# Patient Record
Sex: FEMALE | Race: WHITE | NOT HISPANIC OR LATINO | URBAN - METROPOLITAN AREA
[De-identification: names, ages, dates, MRNs, and addresses within clinical notes are randomized per-mention and may not be internally consistent; named-entity substitution may affect disease eponyms.]

---

## 2018-01-08 NOTE — PATIENT DISCUSSION
I have recommended that she see Dr. Keesha Sofia for yag cap. in the left eye. 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 She will return here on an as needed basis.

## 2019-02-08 NOTE — PATIENT DISCUSSION
Retinal exam findings communicated to Physician managing diabetes. Simple: Patient demonstrates quick and easy understanding/Verbalized Understanding

## 2019-02-08 NOTE — PROCEDURE NOTE: CLINICAL
PROCEDURE NOTE: Lucentis 0.3mg PFS OS. Diagnosis: Diabetic Macular Edema. Anesthesia: Topical. Prep: Betadine Flush. Prior to injection, risks/benefits/alternatives discussed including but not limited to infection, loss of vision or eye, hemorrhage, cataract, glaucoma, retinal tears or detachment. The patient wished to proceed with treatment. Topical anesthesia was induced with Alcaine. Additional anesthesia was achieved using drop(s) or injection checked above. A drop of Povidone-iodine 5% ophthalmic solution was instilled over the injection site and in the inferior fornix. Betadine prep was performed. A single use prefilled syringe of intravitreal Lucentis 0.3mg/0.05ml was used and excess discarded. The needle was passed 3.0 mm posterior to the limbus in pseudophakic patients, and 3.5 mm posterior to the limbus in phakic patients. The remainder of the Lucentis 0.3mg in the single-use vial was then discarded in a medical waste disposal container. The eye was irrigated with sterile irrigating solution. Patient tolerated the procedure well. There were no complications. Post procedure instructions given. CF vision checked. Injection Time *. The patient was instructed to return for re-evaluation in approximately 4-12 weeks depending on his/her condition and was told to call immediately if vision decreases and/or if his/her eye becomes red, painful, and/or light sensitive. The patient was instructed to go to the emergency room or call 911 if unable to reach the doctor within an hour or two of trying or calling. Sal Holguin

## 2019-03-15 NOTE — PROCEDURE NOTE: CLINICAL
PROCEDURE NOTE: Lucentis 0.3mg PFS OS. Diagnosis: Diabetic Macular Edema. Anesthesia: Topical. Prep: Betadine Flush. Prior to injection, risks/benefits/alternatives discussed including but not limited to infection, loss of vision or eye, hemorrhage, cataract, glaucoma, retinal tears or detachment. The patient wished to proceed with treatment. Topical anesthesia was induced with Alcaine. Additional anesthesia was achieved using drop(s) or injection checked above. A drop of Povidone-iodine 5% ophthalmic solution was instilled over the injection site and in the inferior fornix. Betadine prep was performed. A single use prefilled syringe of intravitreal Lucentis 0.3mg/0.05ml was used and excess discarded. The needle was passed 3.0 mm posterior to the limbus in pseudophakic patients, and 3.5 mm posterior to the limbus in phakic patients. The remainder of the Lucentis 0.3mg in the single-use vial was then discarded in a medical waste disposal container. The eye was irrigated with sterile irrigating solution. Patient tolerated the procedure well. There were no complications. Post procedure instructions given. CF vision checked. Injection Time 1005. The patient was instructed to return for re-evaluation in approximately 4-12 weeks depending on his/her condition and was told to call immediately if vision decreases and/or if his/her eye becomes red, painful, and/or light sensitive. The patient was instructed to go to the emergency room or call 911 if unable to reach the doctor within an hour or two of trying or calling. [place]

## 2020-02-14 NOTE — PATIENT DISCUSSION
LUCENTIS AND REPAT IN 5 WKS  - MODERATE EDEAM AT @ 8 WKS - RETX AND REDUCE F/U TO TRY AND CLEAR EDEMA.

## 2020-02-14 NOTE — PROCEDURE NOTE: CLINICAL
PROCEDURE NOTE: Lucentis 0.3mg PFS OS. Diagnosis: Diabetic Macular Edema. Anesthesia: Lidocaine. Prep: Betadine Flush. Prior to injection, risks/benefits/alternatives discussed including but not limited to infection, loss of vision or eye, hemorrhage, cataract, glaucoma, retinal tears or detachment. The patient wished to proceed with treatment. Topical anesthesia was induced with Alcaine. Additional anesthesia was achieved using drop(s) or injection checked above. A drop of Povidone-iodine 5% ophthalmic solution was instilled over the injection site and in the inferior fornix. Betadine prep was performed. A single use prefilled syringe of intravitreal Lucentis 0.3mg/0.05ml was used and excess discarded. The needle was passed 3.0 mm posterior to the limbus in pseudophakic patients, and 3.5 mm posterior to the limbus in phakic patients. The remainder of the Lucentis 0.3mg in the single-use vial was then discarded in a medical waste disposal container. The eye was irrigated with sterile irrigating solution. Patient tolerated the procedure well. There were no complications. Post procedure instructions given. CF vision checked. Injection Time 12:00PM. The patient was instructed to return for re-evaluation in approximately 4-12 weeks depending on his/her condition and was told to call immediately if vision decreases and/or if his/her eye becomes red, painful, and/or light sensitive. The patient was instructed to go to the emergency room or call 911 if unable to reach the doctor within an hour or two of trying or calling. Jeri Dietz

## 2020-07-09 ENCOUNTER — OFFICE VISIT (OUTPATIENT)
Dept: URBAN - METROPOLITAN AREA SURGERY CENTER 4 | Facility: SURGERY CENTER | Age: 70
End: 2020-07-09

## 2020-07-13 LAB — PATHOLOGY (INDENTED REPORT): (no result)

## 2022-01-24 ENCOUNTER — NEW PATIENT (OUTPATIENT)
Dept: URBAN - METROPOLITAN AREA CLINIC 26 | Facility: CLINIC | Age: 72
End: 2022-01-24

## 2022-01-24 VITALS
HEART RATE: 70 BPM | DIASTOLIC BLOOD PRESSURE: 91 MMHG | HEIGHT: 67 IN | WEIGHT: 125 LBS | BODY MASS INDEX: 19.62 KG/M2 | SYSTOLIC BLOOD PRESSURE: 154 MMHG

## 2022-01-24 DIAGNOSIS — H43.813: ICD-10-CM

## 2022-01-24 DIAGNOSIS — H04.123: ICD-10-CM

## 2022-01-24 DIAGNOSIS — H35.3132: ICD-10-CM

## 2022-01-24 DIAGNOSIS — H35.363: ICD-10-CM

## 2022-01-24 DIAGNOSIS — H35.373: ICD-10-CM

## 2022-01-24 DIAGNOSIS — H33.011: ICD-10-CM

## 2022-01-24 PROCEDURE — 99204 OFFICE O/P NEW MOD 45 MIN: CPT

## 2022-01-24 PROCEDURE — 92134 CPTRZ OPH DX IMG PST SGM RTA: CPT

## 2022-01-24 PROCEDURE — 92250 FUNDUS PHOTOGRAPHY W/I&R: CPT

## 2022-01-24 RX ORDER — TOBRAMYCIN 3 MG/ML: 1 SOLUTION/ DROPS OPHTHALMIC

## 2022-01-24 ASSESSMENT — VISUAL ACUITY
OD_SC: 20/200
OS_SC: 20/20-2
OD_SC: 20/60

## 2022-01-24 ASSESSMENT — TONOMETRY
OD_IOP_MMHG: 14
OS_IOP_MMHG: 16

## 2022-01-24 NOTE — PATIENT DISCUSSION
1/24/22: GIVEN INFERIOR LOCATION, ? TWO LESIONS AND EARLY PVR Recommended VITRECTOMY, endolaser, FLUID/GAS EXCHANGE, and SEGMENTAL INFERIOR SCLERAL BUCKLE. Discussed benefits, alternatives, and risks of surgery including (but not limited to) cataract (if patient has natural lens), glaucoma, infection, bleeding, redetachment, optic neuropathy, loss of vision, blindness, double vision and loss of eye. Patient understands more than one operation may be needed to repair retinal detachment.

## 2022-01-24 NOTE — PATIENT DISCUSSION
No retinal tears or retinal detachment seen on clinical exam today OS, OD AS ABOVE. Reviewed the signs and symptoms of retinal tear/retinal detachment and the importance of calling for prompt evaluation should there be increasing floaters, new flashing lights, or decreasing peripheral vision in either eye at any time. Observation recommended.

## 2022-01-26 ENCOUNTER — SURGERY/PROCEDURE (OUTPATIENT)
Dept: URBAN - METROPOLITAN AREA SURGERY 10 | Facility: SURGERY | Age: 72
End: 2022-01-26

## 2022-01-26 DIAGNOSIS — H33.011: ICD-10-CM

## 2022-01-26 PROCEDURE — 68841 INSJ RX ELUT IMPLT LAC CANAL: CPT

## 2022-01-26 PROCEDURE — 67108 REPAIR DETACHED RETINA: CPT

## 2022-01-27 ENCOUNTER — POST-OP (OUTPATIENT)
Dept: URBAN - METROPOLITAN AREA CLINIC 26 | Facility: CLINIC | Age: 72
End: 2022-01-27

## 2022-01-27 DIAGNOSIS — Z98.890: ICD-10-CM

## 2022-01-27 DIAGNOSIS — H33.011: ICD-10-CM

## 2022-01-27 PROCEDURE — 99024 POSTOP FOLLOW-UP VISIT: CPT

## 2022-01-27 ASSESSMENT — TONOMETRY
OS_IOP_MMHG: 15
OD_IOP_MMHG: 21
OD_IOP_MMHG: 22

## 2022-01-27 ASSESSMENT — VISUAL ACUITY: OS_SC: 20/20

## 2022-02-02 ENCOUNTER — POST-OP (OUTPATIENT)
Dept: URBAN - METROPOLITAN AREA CLINIC 26 | Facility: CLINIC | Age: 72
End: 2022-02-02

## 2022-02-02 DIAGNOSIS — H33.011: ICD-10-CM

## 2022-02-02 DIAGNOSIS — Z98.890: ICD-10-CM

## 2022-02-02 PROCEDURE — 99024 POSTOP FOLLOW-UP VISIT: CPT

## 2022-02-02 RX ORDER — DORZOLAMIDE HYDROCHLORIDE AND TIMOLOL MALEATE 20; 5 MG/ML; MG/ML: 1 SOLUTION/ DROPS OPHTHALMIC

## 2022-02-02 ASSESSMENT — VISUAL ACUITY
OS_SC: 20/25+2
OD_SC: 20/400-1
OD_PH: 20/200-1

## 2022-02-02 ASSESSMENT — TONOMETRY
OD_IOP_MMHG: 32
OS_IOP_MMHG: 14
OD_IOP_MMHG: 33

## 2022-02-09 ENCOUNTER — POST-OP (OUTPATIENT)
Dept: URBAN - METROPOLITAN AREA CLINIC 26 | Facility: CLINIC | Age: 72
End: 2022-02-09

## 2022-02-09 DIAGNOSIS — H35.363: ICD-10-CM

## 2022-02-09 DIAGNOSIS — Z98.890: ICD-10-CM

## 2022-02-09 DIAGNOSIS — H35.3132: ICD-10-CM

## 2022-02-09 DIAGNOSIS — H33.011: ICD-10-CM

## 2022-02-09 DIAGNOSIS — H35.373: ICD-10-CM

## 2022-02-09 PROCEDURE — 99024 POSTOP FOLLOW-UP VISIT: CPT

## 2022-02-09 PROCEDURE — 92250 FUNDUS PHOTOGRAPHY W/I&R: CPT

## 2022-02-09 ASSESSMENT — TONOMETRY
OD_IOP_MMHG: 18
OS_IOP_MMHG: 15

## 2022-02-09 ASSESSMENT — VISUAL ACUITY
OS_SC: 20/25
OD_SC: CF 2FT

## 2022-02-09 NOTE — PATIENT DISCUSSION
DISCUSSED THE DX, IMAGES, PROGNOSIS AND TX PLAN. PT REQUIRES SX, RISK OF VI: MOD/HIGH. ALL QUESTIONS WERE ANSWERED. normal sinus rhythm

## 2022-02-09 NOTE — PATIENT DISCUSSION
2/2/22: WILL ADD COSOPT TO CONTROL IOP SPIKE. MED SHOULD BE DECREASING. Recommended continued MONITORING of RESPONSE to therapy.

## 2022-03-01 ENCOUNTER — POST-OP (OUTPATIENT)
Dept: URBAN - METROPOLITAN AREA CLINIC 26 | Facility: CLINIC | Age: 72
End: 2022-03-01

## 2022-03-01 DIAGNOSIS — H35.363: ICD-10-CM

## 2022-03-01 DIAGNOSIS — H33.011: ICD-10-CM

## 2022-03-01 DIAGNOSIS — Z98.890: ICD-10-CM

## 2022-03-01 DIAGNOSIS — H35.3132: ICD-10-CM

## 2022-03-01 PROCEDURE — 92134 CPTRZ OPH DX IMG PST SGM RTA: CPT

## 2022-03-01 PROCEDURE — 99024 POSTOP FOLLOW-UP VISIT: CPT

## 2022-03-01 ASSESSMENT — TONOMETRY
OS_IOP_MMHG: 14
OD_IOP_MMHG: 15

## 2022-03-01 ASSESSMENT — VISUAL ACUITY
OD_SC: CF 1FT
OS_SC: 20/30-1

## 2022-03-01 NOTE — PATIENT DISCUSSION
My findings and recommendations are based on patient's symptoms, eye exam, diagnostic testing, and records. Home

## 2022-04-07 ENCOUNTER — POST-OP (OUTPATIENT)
Dept: URBAN - METROPOLITAN AREA CLINIC 26 | Facility: CLINIC | Age: 72
End: 2022-04-07

## 2022-04-07 DIAGNOSIS — H35.373: ICD-10-CM

## 2022-04-07 DIAGNOSIS — H35.363: ICD-10-CM

## 2022-04-07 DIAGNOSIS — Z98.890: ICD-10-CM

## 2022-04-07 DIAGNOSIS — H33.011: ICD-10-CM

## 2022-04-07 DIAGNOSIS — H35.3132: ICD-10-CM

## 2022-04-07 PROCEDURE — 99024 POSTOP FOLLOW-UP VISIT: CPT

## 2022-04-07 PROCEDURE — 92134 CPTRZ OPH DX IMG PST SGM RTA: CPT

## 2022-04-07 ASSESSMENT — TONOMETRY
OS_IOP_MMHG: 16
OD_IOP_MMHG: 17

## 2022-04-07 ASSESSMENT — VISUAL ACUITY
OD_SC: 20/30-1
OS_SC: 20/25

## 2022-06-07 ENCOUNTER — FOLLOW UP (OUTPATIENT)
Dept: URBAN - METROPOLITAN AREA CLINIC 26 | Facility: CLINIC | Age: 72
End: 2022-06-07

## 2022-06-07 DIAGNOSIS — H40.9: ICD-10-CM

## 2022-06-07 DIAGNOSIS — H04.123: ICD-10-CM

## 2022-06-07 DIAGNOSIS — H43.813: ICD-10-CM

## 2022-06-07 DIAGNOSIS — H40.041: ICD-10-CM

## 2022-06-07 DIAGNOSIS — H35.3132: ICD-10-CM

## 2022-06-07 DIAGNOSIS — H35.363: ICD-10-CM

## 2022-06-07 DIAGNOSIS — H35.373: ICD-10-CM

## 2022-06-07 DIAGNOSIS — H33.011: ICD-10-CM

## 2022-06-07 PROCEDURE — 92134 CPTRZ OPH DX IMG PST SGM RTA: CPT

## 2022-06-07 PROCEDURE — 92014 COMPRE OPH EXAM EST PT 1/>: CPT

## 2022-06-07 RX ORDER — DORZOLAMIDE 20 MG/ML: 1 SOLUTION/ DROPS OPHTHALMIC TWICE A DAY

## 2022-06-07 ASSESSMENT — TONOMETRY
OS_IOP_MMHG: 14
OD_IOP_MMHG: 16

## 2022-06-07 ASSESSMENT — VISUAL ACUITY
OS_SC: 20/20-1
OD_SC: 20/30+1

## 2022-06-07 NOTE — PATIENT DISCUSSION
ERM does NOT APPEAR VISUALLY SIGNIFICANT OS. SLIGHTLY PROGRESSED IN OD. WE'LL CONT TO MONITOR TO DETERMINE NEED FOR PEELING.

## 2022-07-06 ENCOUNTER — FOLLOW UP (OUTPATIENT)
Dept: URBAN - METROPOLITAN AREA CLINIC 26 | Facility: CLINIC | Age: 72
End: 2022-07-06

## 2022-07-06 DIAGNOSIS — H04.123: ICD-10-CM

## 2022-07-06 DIAGNOSIS — H40.9: ICD-10-CM

## 2022-07-06 DIAGNOSIS — H35.373: ICD-10-CM

## 2022-07-06 DIAGNOSIS — H40.041: ICD-10-CM

## 2022-07-06 DIAGNOSIS — H35.363: ICD-10-CM

## 2022-07-06 DIAGNOSIS — H33.011: ICD-10-CM

## 2022-07-06 DIAGNOSIS — H43.813: ICD-10-CM

## 2022-07-06 DIAGNOSIS — H35.3132: ICD-10-CM

## 2022-07-06 PROCEDURE — 92134 CPTRZ OPH DX IMG PST SGM RTA: CPT

## 2022-07-06 PROCEDURE — 92250 FUNDUS PHOTOGRAPHY W/I&R: CPT

## 2022-07-06 PROCEDURE — 92012 INTRM OPH EXAM EST PATIENT: CPT

## 2022-07-06 ASSESSMENT — TONOMETRY
OD_IOP_MMHG: 15
OS_IOP_MMHG: 16

## 2022-07-06 ASSESSMENT — VISUAL ACUITY
OD_SC: 20/25-2
OS_SC: 20/20

## 2022-07-09 ENCOUNTER — TELEPHONE ENCOUNTER (OUTPATIENT)
Dept: URBAN - METROPOLITAN AREA CLINIC 121 | Facility: CLINIC | Age: 72
End: 2022-07-09

## 2022-07-10 ENCOUNTER — TELEPHONE ENCOUNTER (OUTPATIENT)
Dept: URBAN - METROPOLITAN AREA CLINIC 121 | Facility: CLINIC | Age: 72
End: 2022-07-10

## 2022-07-10 RX ORDER — LEVOTHYROXINE SODIUM 88 UG/1
TABLET ORAL
Refills: 0 | Status: ACTIVE | COMMUNITY
Start: 2020-03-10

## 2022-07-10 RX ORDER — TRAMADOL HYDROCHLORIDE 50 MG/1
TABLET ORAL
Refills: 0 | Status: ACTIVE | COMMUNITY
Start: 2020-03-10

## 2022-07-10 RX ORDER — MONTELUKAST 10 MG/1
TABLET, FILM COATED ORAL
Refills: 0 | Status: ACTIVE | COMMUNITY
Start: 2020-03-10

## 2022-07-10 RX ORDER — DORZOLAMIDE HCL/PF 2 %
DROPS OPHTHALMIC (EYE)
Refills: 0 | Status: ACTIVE | COMMUNITY
Start: 2020-03-10

## 2022-07-10 RX ORDER — AMLODIPINE BESYLATE 5 MG/1
TABLET ORAL
Refills: 0 | Status: ACTIVE | COMMUNITY
Start: 2020-03-10

## 2022-07-10 RX ORDER — ONDANSETRON 4 MG/1
USE AS DIRECTED ONE TABLET 30 MINUTES BEFORE EACH DOSE OF LAXATIVE FOR BOWEL PREP TABLET, ORALLY DISINTEGRATING ORAL
Refills: 0 | Status: ACTIVE | COMMUNITY
Start: 2020-03-10

## 2022-11-01 ENCOUNTER — FOLLOW UP (OUTPATIENT)
Dept: URBAN - METROPOLITAN AREA CLINIC 26 | Facility: CLINIC | Age: 72
End: 2022-11-01

## 2022-11-01 VITALS — HEIGHT: 67 IN | BODY MASS INDEX: 18.83 KG/M2 | WEIGHT: 120 LBS

## 2022-11-01 DIAGNOSIS — H40.9: ICD-10-CM

## 2022-11-01 DIAGNOSIS — H04.123: ICD-10-CM

## 2022-11-01 DIAGNOSIS — H35.3132: ICD-10-CM

## 2022-11-01 DIAGNOSIS — H43.813: ICD-10-CM

## 2022-11-01 DIAGNOSIS — H35.373: ICD-10-CM

## 2022-11-01 DIAGNOSIS — H35.363: ICD-10-CM

## 2022-11-01 DIAGNOSIS — H33.011: ICD-10-CM

## 2022-11-01 DIAGNOSIS — H40.041: ICD-10-CM

## 2022-11-01 PROCEDURE — 92250 FUNDUS PHOTOGRAPHY W/I&R: CPT

## 2022-11-01 PROCEDURE — 92014 COMPRE OPH EXAM EST PT 1/>: CPT

## 2022-11-01 PROCEDURE — 92134 CPTRZ OPH DX IMG PST SGM RTA: CPT

## 2022-11-01 ASSESSMENT — VISUAL ACUITY
OD_CC: 20/25-2
OS_CC: 20/25-2

## 2022-11-01 ASSESSMENT — TONOMETRY
OD_IOP_MMHG: 15
OS_IOP_MMHG: 14

## 2022-11-01 NOTE — PATIENT DISCUSSION
11/1/22: SLIGHT PROGRESSION OF PED IN OS. LARGER THAN PREVIOUS VISIT 4 MO AGO. WE'LL; MONITOR IN 3 MO TO DETERMINE IF CONVERSION IS PRESENT. PT INSTRUCTED TO CALL ASAP IF CHANGES ARISE.

## 2023-04-12 ENCOUNTER — FOLLOW UP (OUTPATIENT)
Dept: URBAN - METROPOLITAN AREA CLINIC 26 | Facility: CLINIC | Age: 73
End: 2023-04-12

## 2023-04-12 VITALS — WEIGHT: 120 LBS | HEIGHT: 67 IN | BODY MASS INDEX: 18.83 KG/M2

## 2023-04-12 DIAGNOSIS — H02.831: ICD-10-CM

## 2023-04-12 DIAGNOSIS — H35.3132: ICD-10-CM

## 2023-04-12 DIAGNOSIS — H40.9: ICD-10-CM

## 2023-04-12 DIAGNOSIS — Z96.1: ICD-10-CM

## 2023-04-12 DIAGNOSIS — H35.363: ICD-10-CM

## 2023-04-12 DIAGNOSIS — H04.123: ICD-10-CM

## 2023-04-12 DIAGNOSIS — H35.373: ICD-10-CM

## 2023-04-12 DIAGNOSIS — H35.63: ICD-10-CM

## 2023-04-12 DIAGNOSIS — H40.041: ICD-10-CM

## 2023-04-12 DIAGNOSIS — H33.011: ICD-10-CM

## 2023-04-12 DIAGNOSIS — H43.812: ICD-10-CM

## 2023-04-12 DIAGNOSIS — H02.834: ICD-10-CM

## 2023-04-12 DIAGNOSIS — Z98.890: ICD-10-CM

## 2023-04-12 PROCEDURE — 92012 INTRM OPH EXAM EST PATIENT: CPT

## 2023-04-12 PROCEDURE — 92250 FUNDUS PHOTOGRAPHY W/I&R: CPT

## 2023-04-12 PROCEDURE — 92134 CPTRZ OPH DX IMG PST SGM RTA: CPT

## 2023-04-12 ASSESSMENT — TONOMETRY
OD_IOP_MMHG: 17
OS_IOP_MMHG: 19

## 2023-04-12 ASSESSMENT — VISUAL ACUITY
OD_CC: 20/25+2
OS_CC: 20/20-1

## 2023-11-07 NOTE — PATIENT DISCUSSION
My findings and recommendations are based on patient's symptoms, eye exam, diagnostic testing, and records. Detail Level: Detailed Quality 111:Pneumonia Vaccination Status For Older Adults: Patient received any pneumococcal conjugate or polysaccharide vaccine on or after their 60th birthday and before the end of the measurement period Quality 226: Preventive Care And Screening: Tobacco Use: Screening And Cessation Intervention: Patient screened for tobacco use and is an ex/non-smoker Quality 130: Documentation Of Current Medications In The Medical Record: Current Medications Documented